# Patient Record
Sex: FEMALE | Race: BLACK OR AFRICAN AMERICAN
[De-identification: names, ages, dates, MRNs, and addresses within clinical notes are randomized per-mention and may not be internally consistent; named-entity substitution may affect disease eponyms.]

---

## 2018-07-24 ENCOUNTER — HOSPITAL ENCOUNTER (INPATIENT)
Dept: HOSPITAL 68 - GNO | Age: 28
LOS: 1 days | DRG: 560 | End: 2018-07-25
Attending: OBSTETRICS & GYNECOLOGY | Admitting: OBSTETRICS & GYNECOLOGY
Payer: COMMERCIAL

## 2018-07-24 VITALS — HEIGHT: 63 IN | BODY MASS INDEX: 33.66 KG/M2 | WEIGHT: 190 LBS

## 2018-07-24 VITALS — DIASTOLIC BLOOD PRESSURE: 71 MMHG | SYSTOLIC BLOOD PRESSURE: 116 MMHG

## 2018-07-24 DIAGNOSIS — Z3A.32: ICD-10-CM

## 2018-07-24 DIAGNOSIS — F12.90: ICD-10-CM

## 2018-07-24 LAB
ABSOLUTE GRANULOCYTE CT: 10.8 /CUMM (ref 1.4–6.5)
BASOPHILS # BLD: 0 /CUMM (ref 0–0.2)
BASOPHILS NFR BLD: 0.3 % (ref 0–2)
EOSINOPHIL # BLD: 0.1 /CUMM (ref 0–0.7)
EOSINOPHIL NFR BLD: 0.7 % (ref 0–5)
ERYTHROCYTE [DISTWIDTH] IN BLOOD BY AUTOMATED COUNT: 13.3 % (ref 11.5–14.5)
GRANULOCYTES NFR BLD: 72.9 % (ref 42.2–75.2)
HCT VFR BLD CALC: 31.3 % (ref 37–47)
LYMPHOCYTES # BLD: 3 /CUMM (ref 1.2–3.4)
MCH RBC QN AUTO: 29.9 PG (ref 27–31)
MCHC RBC AUTO-ENTMCNC: 33.6 G/DL (ref 33–37)
MCV RBC AUTO: 88.9 FL (ref 81–99)
MONOCYTES # BLD: 0.8 /CUMM (ref 0.1–0.6)
PLATELET # BLD: 280 /CUMM (ref 130–400)
PMV BLD AUTO: 10.5 FL (ref 7.4–10.4)
RED BLOOD CELL CT: 3.52 /CUMM (ref 4.2–5.4)
WBC # BLD AUTO: 14.8 /CUMM (ref 4.8–10.8)

## 2018-07-24 NOTE — LABOR & DELIVERY SUMMARY
Delivery Summary
Vaginal Delivery:
   Vaginal: vertex
Episiotomy/Lacerations:
   Episiotomy/Lacerations: none
   Type:
intact
   Repair:
none
   Anesthesia:
none
Placenta:
   Placenta: spontanteous, normal, 3 vessel
Anesthesia: none
Cord PH Value:
7.44
Baby's Weight:
2240
Apgars - 1 Min: 8
Apgars - 5 Min: 9
Additional Comments:
Pt arrived in labor with no PNC fully dilated vertex IV started labs drawn pt 
delivered over intact perineum placenta followed intact good hemostasis with IV 
pitocin. Peds in attendance.

## 2018-07-24 NOTE — HISTORY & PHYSICAL
General Information and HPI
MD Statement:
I have seen and personally examined LOY JI and documented this H&P.
 
The patient is a 28 year old female at unknown weeks and days gestation who 
presented with a chief complaint of labor pains..
 
Source of Information: patient
Exam Limitations: confusion
History of Present Illness:
Pt is pregnant LMP unknown has been having contractions for a few hours she 
called the ambulance and is now on L&D. She has no PNC and this is her fourth 
baby and according to the patient no major problems in her previous three
 
Allergies/Medications
Allergies:
Coded Allergies:
NO KNOWN ALLERGIES (NONE 18)
 
Home Med list
No Known Home Medications
 
Compliance With Home Meds: UNKNOWN
 
Past History
 
OB/Gyn History
: 4
Para: 3
Last Menstrual Period:
unk
Estimated Delivery Date:
unk
Past OB/Gyn History: unobtainable
 
Surgical History
Pertinent Surgical History: unobtainable
 
Review of Systems
 
Review of Systems
Constitutional:
Denies: chills, fever. 
EENTM:
Denies: blurred vision, double vision, visual changes. 
Cardiovascular:
Reports: no symptoms. 
 
Exam & Diagnostic Data
Last 24 Hrs of Vital Signs/I&O
unk
 
Obstetric Exam
Wgt Gained During Pregnancy:
unk
Pelvimetry:
seems adequate
Dilation (cm): 9
Effacement (%): 99
Station: 0
Membranes: intact
Fluid: unknown
Fundal Height (cm): 26
Multiple Gestation? No
Contractions:
Q 3-4 min
Infant #1 -
   FHR Baseline: 130
   Category: 1
   Estimated Fetal Weight:
2000g
   Fetal Presentation:
vtx
Patient for Induction? No
 
Physical Exam
General Appearance Alert, Oriented X3, Severe Distress
Skin No Rashes, No Breakdown
HEENT Atraumatic
Neck Supple
Cardiovascular Regular Rate
Lungs Clear to Auscultation
Abdomen fundus 27-30 cm
Neurological Normal Speech
Extremities 3+ edema pf LE
 
Prenatal Labs
Blood Type & Rh:
B POS
Antibody Screen:
neg
Hct/Hgb & Platelets #1:
10.5/31.3/280
Hct/Hgb & Platelets #2:
not done
Rubella:
pending
VDRL #1:
pending
VDRL #2:
pending
HbsAg:
pending
HIV #1:
negative
HIV #2
not done
1 Hr PG:
none
Group B Strep:
unk
Initial Ultrasound:
no
Anatomy Ultrasound:
no
Ultrasound for EFW:
no
Genetic Testing:
none
Last 24 Hrs of Labs/Chris:
 Laboratory Tests
 
18 0950:
Glucose Pending, Hemoglobin A1c Pending, RPR Titer/FTA Pending, Rubella Screen 
Pending
 
18 0950:
Creatinine Pending, Uric Acid Pending, AST Pending, ALT Pending, Lactate 
Dehydrogenase Pending, CBC w Diff Pending, WBC Pending, RBC Pending, Hgb Pending
, Hct Pending, MCV Pending, MCH Pending, MCHC Pending, RDW Pending, Plt Count 
Pending, MPV Pending, Hep Bs Antigen Pending, HIV 1&2 Ab Western Blot Pending
 
 
Assessment/Plan
Assessment/Plan:
active labor fully dilated with urge to push
perepheral edema
 
PIH labs negative
no PNc
 
plan labs ordered
Social work consult
tox screen
PIH eval
peds at delivery
 
As Ranked By This Provider
Problem List:
 1. Pregnancy
 
 
Core Measures
 
Venous Thromboembolism
VTE Risk Factors Pregnancy/Postpartum
No Mechanical VTE Prophylaxis d/t LowRisk-No Interven Req'd
No VTE Pharm Prophylaxis d/t LowRisk-No Interven Req'd
 
Attending MD Review Statement
 
Attending Statement
Attending MD Statement: examined this patient, discussed w/nursing

## 2018-07-25 LAB
ABSOLUTE GRANULOCYTE CT: 13.6 /CUMM (ref 1.4–6.5)
BASOPHILS # BLD: 0 /CUMM (ref 0–0.2)
BASOPHILS NFR BLD: 0.2 % (ref 0–2)
EOSINOPHIL # BLD: 0.1 /CUMM (ref 0–0.7)
EOSINOPHIL NFR BLD: 0.6 % (ref 0–5)
ERYTHROCYTE [DISTWIDTH] IN BLOOD BY AUTOMATED COUNT: 13.4 % (ref 11.5–14.5)
GRANULOCYTES NFR BLD: 85 % (ref 42.2–75.2)
HCT VFR BLD CALC: 28.8 % (ref 37–47)
LYMPHOCYTES # BLD: 1.8 /CUMM (ref 1.2–3.4)
MCH RBC QN AUTO: 30.4 PG (ref 27–31)
MCHC RBC AUTO-ENTMCNC: 34.1 G/DL (ref 33–37)
MCV RBC AUTO: 89.1 FL (ref 81–99)
MONOCYTES # BLD: 0.5 /CUMM (ref 0.1–0.6)
PLATELET # BLD: 233 /CUMM (ref 130–400)
PMV BLD AUTO: 10.5 FL (ref 7.4–10.4)
RED BLOOD CELL CT: 3.23 /CUMM (ref 4.2–5.4)
WBC # BLD AUTO: 16 /CUMM (ref 4.8–10.8)

## 2018-07-25 NOTE — PN- OBGYN
Surgical Brief Attending Note
Brief Attending Note:
pt feels well. no pain. amb / void / amcy po. desires d/c home today as baby in 
[facility] at [place]. intends to bf.
afeb, v/ss
nad
abd soft nt ff
melvin min lochia
ext nt +1 b/l le ed
hct 31
hiv neg, hepbsag neg
vdrl pending, rub pending
mbt b pos
utox +cannibinoid
a/p
ppd 1 s/p precip  of  infant , unsure gestational age, possibly 32 
wks, no prenatal care, baby transferred to [place] - doing well
-s/p sw consult - will follow
-order tdap
-d/c home - instructions reviewed
-prefers to follow up at [facility] across from [facility] in 2 wks and 6
wks